# Patient Record
Sex: MALE | Race: BLACK OR AFRICAN AMERICAN | NOT HISPANIC OR LATINO | URBAN - METROPOLITAN AREA
[De-identification: names, ages, dates, MRNs, and addresses within clinical notes are randomized per-mention and may not be internally consistent; named-entity substitution may affect disease eponyms.]

---

## 2017-05-31 ENCOUNTER — EMERGENCY (EMERGENCY)
Facility: HOSPITAL | Age: 22
LOS: 0 days | Discharge: LEFT BEFORE DISCHARGE | End: 2017-05-31

## 2017-06-28 DIAGNOSIS — F10.239 ALCOHOL DEPENDENCE WITH WITHDRAWAL, UNSPECIFIED: ICD-10-CM

## 2017-06-28 DIAGNOSIS — Z87.891 PERSONAL HISTORY OF NICOTINE DEPENDENCE: ICD-10-CM

## 2017-06-28 DIAGNOSIS — Z79.899 OTHER LONG TERM (CURRENT) DRUG THERAPY: ICD-10-CM

## 2017-11-16 ENCOUNTER — EMERGENCY (EMERGENCY)
Facility: HOSPITAL | Age: 22
LOS: 0 days | Discharge: LEFT BEFORE DISCHARGE | End: 2017-11-16

## 2017-11-16 DIAGNOSIS — F17.200 NICOTINE DEPENDENCE, UNSPECIFIED, UNCOMPLICATED: ICD-10-CM

## 2017-11-16 DIAGNOSIS — F20.0 PARANOID SCHIZOPHRENIA: ICD-10-CM

## 2017-11-16 DIAGNOSIS — F10.10 ALCOHOL ABUSE, UNCOMPLICATED: ICD-10-CM

## 2017-11-16 DIAGNOSIS — Z79.899 OTHER LONG TERM (CURRENT) DRUG THERAPY: ICD-10-CM

## 2017-11-16 DIAGNOSIS — F10.20 ALCOHOL DEPENDENCE, UNCOMPLICATED: ICD-10-CM

## 2017-11-16 DIAGNOSIS — Z87.891 PERSONAL HISTORY OF NICOTINE DEPENDENCE: ICD-10-CM

## 2017-11-16 DIAGNOSIS — F20.9 SCHIZOPHRENIA, UNSPECIFIED: ICD-10-CM

## 2017-11-16 DIAGNOSIS — F16.20 HALLUCINOGEN DEPENDENCE, UNCOMPLICATED: ICD-10-CM

## 2017-11-19 ENCOUNTER — INPATIENT (INPATIENT)
Facility: HOSPITAL | Age: 22
LOS: 1 days | Discharge: LEFT BEFORE DISCHARGE | End: 2017-11-21
Attending: INTERNAL MEDICINE

## 2017-11-19 DIAGNOSIS — F20.9 SCHIZOPHRENIA, UNSPECIFIED: ICD-10-CM

## 2017-11-19 DIAGNOSIS — F16.20 HALLUCINOGEN DEPENDENCE, UNCOMPLICATED: ICD-10-CM

## 2017-11-19 DIAGNOSIS — F20.0 PARANOID SCHIZOPHRENIA: ICD-10-CM

## 2017-11-19 DIAGNOSIS — F10.20 ALCOHOL DEPENDENCE, UNCOMPLICATED: ICD-10-CM

## 2017-11-19 DIAGNOSIS — F17.200 NICOTINE DEPENDENCE, UNSPECIFIED, UNCOMPLICATED: ICD-10-CM

## 2017-11-29 DIAGNOSIS — F10.20 ALCOHOL DEPENDENCE, UNCOMPLICATED: ICD-10-CM

## 2017-11-29 DIAGNOSIS — F16.20 HALLUCINOGEN DEPENDENCE, UNCOMPLICATED: ICD-10-CM

## 2017-11-29 DIAGNOSIS — F17.210 NICOTINE DEPENDENCE, CIGARETTES, UNCOMPLICATED: ICD-10-CM
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
## 2017-11-29 DIAGNOSIS — F20.0 PARANOID SCHIZOPHRENIA: ICD-10-CM

## 2017-11-29 DIAGNOSIS — F12.20 CANNABIS DEPENDENCE, UNCOMPLICATED: ICD-10-CM

## 2019-05-29 ENCOUNTER — OUTPATIENT (OUTPATIENT)
Dept: OUTPATIENT SERVICES | Facility: HOSPITAL | Age: 24
LOS: 1 days | Discharge: HOME | End: 2019-05-29

## 2019-05-29 DIAGNOSIS — F12.20 CANNABIS DEPENDENCE, UNCOMPLICATED: ICD-10-CM

## 2019-05-29 DIAGNOSIS — F20.9 SCHIZOPHRENIA, UNSPECIFIED: ICD-10-CM

## 2019-06-06 ENCOUNTER — OUTPATIENT (OUTPATIENT)
Dept: OUTPATIENT SERVICES | Facility: HOSPITAL | Age: 24
LOS: 1 days | Discharge: HOME | End: 2019-06-06
Payer: COMMERCIAL

## 2019-06-06 DIAGNOSIS — F20.9 SCHIZOPHRENIA, UNSPECIFIED: ICD-10-CM

## 2019-06-06 PROCEDURE — 99214 OFFICE O/P EST MOD 30 MIN: CPT | Mod: GC

## 2019-06-26 ENCOUNTER — OUTPATIENT (OUTPATIENT)
Dept: OUTPATIENT SERVICES | Facility: HOSPITAL | Age: 24
LOS: 1 days | Discharge: HOME | End: 2019-06-26

## 2019-06-26 DIAGNOSIS — F20.9 SCHIZOPHRENIA, UNSPECIFIED: ICD-10-CM

## 2019-07-23 ENCOUNTER — OUTPATIENT (OUTPATIENT)
Dept: OUTPATIENT SERVICES | Facility: HOSPITAL | Age: 24
LOS: 1 days | Discharge: HOME | End: 2019-07-23
Payer: COMMERCIAL

## 2019-07-23 DIAGNOSIS — F20.9 SCHIZOPHRENIA, UNSPECIFIED: ICD-10-CM

## 2019-07-23 PROCEDURE — 99214 OFFICE O/P EST MOD 30 MIN: CPT | Mod: GC

## 2019-08-20 ENCOUNTER — OUTPATIENT (OUTPATIENT)
Dept: OUTPATIENT SERVICES | Facility: HOSPITAL | Age: 24
LOS: 1 days | Discharge: HOME | End: 2019-08-20
Payer: COMMERCIAL

## 2019-08-20 DIAGNOSIS — F20.9 SCHIZOPHRENIA, UNSPECIFIED: ICD-10-CM

## 2019-08-20 PROCEDURE — 99213 OFFICE O/P EST LOW 20 MIN: CPT | Mod: GC

## 2019-09-17 ENCOUNTER — OUTPATIENT (OUTPATIENT)
Dept: OUTPATIENT SERVICES | Facility: HOSPITAL | Age: 24
LOS: 1 days | Discharge: HOME | End: 2019-09-17

## 2019-09-17 DIAGNOSIS — F20.9 SCHIZOPHRENIA, UNSPECIFIED: ICD-10-CM

## 2019-10-15 ENCOUNTER — INPATIENT (INPATIENT)
Facility: HOSPITAL | Age: 24
LOS: 12 days | Discharge: HOME | End: 2019-10-28
Attending: PSYCHIATRY & NEUROLOGY | Admitting: PSYCHIATRY & NEUROLOGY
Payer: COMMERCIAL

## 2019-10-15 ENCOUNTER — OUTPATIENT (OUTPATIENT)
Dept: OUTPATIENT SERVICES | Facility: HOSPITAL | Age: 24
LOS: 1 days | Discharge: HOME | End: 2019-10-15

## 2019-10-15 VITALS
HEART RATE: 95 BPM | RESPIRATION RATE: 18 BRPM | OXYGEN SATURATION: 100 % | SYSTOLIC BLOOD PRESSURE: 139 MMHG | TEMPERATURE: 97 F | DIASTOLIC BLOOD PRESSURE: 82 MMHG

## 2019-10-15 DIAGNOSIS — F20.1 DISORGANIZED SCHIZOPHRENIA: ICD-10-CM

## 2019-10-15 DIAGNOSIS — F20.9 SCHIZOPHRENIA, UNSPECIFIED: ICD-10-CM

## 2019-10-15 LAB
ALBUMIN SERPL ELPH-MCNC: 4.5 G/DL — SIGNIFICANT CHANGE UP (ref 3.5–5.2)
ALP SERPL-CCNC: 74 U/L — SIGNIFICANT CHANGE UP (ref 30–115)
ALT FLD-CCNC: 14 U/L — SIGNIFICANT CHANGE UP (ref 0–41)
ANION GAP SERPL CALC-SCNC: 12 MMOL/L — SIGNIFICANT CHANGE UP (ref 7–14)
APAP SERPL-MCNC: <5 UG/ML — LOW (ref 10–30)
AST SERPL-CCNC: 19 U/L — SIGNIFICANT CHANGE UP (ref 0–41)
BASOPHILS # BLD AUTO: 0.05 K/UL — SIGNIFICANT CHANGE UP (ref 0–0.2)
BASOPHILS NFR BLD AUTO: 0.9 % — SIGNIFICANT CHANGE UP (ref 0–1)
BILIRUB SERPL-MCNC: 0.3 MG/DL — SIGNIFICANT CHANGE UP (ref 0.2–1.2)
BUN SERPL-MCNC: 16 MG/DL — SIGNIFICANT CHANGE UP (ref 10–20)
CALCIUM SERPL-MCNC: 9.9 MG/DL — SIGNIFICANT CHANGE UP (ref 8.5–10.1)
CHLORIDE SERPL-SCNC: 103 MMOL/L — SIGNIFICANT CHANGE UP (ref 98–110)
CO2 SERPL-SCNC: 25 MMOL/L — SIGNIFICANT CHANGE UP (ref 17–32)
CREAT SERPL-MCNC: 0.7 MG/DL — SIGNIFICANT CHANGE UP (ref 0.7–1.5)
EOSINOPHIL # BLD AUTO: 0.33 K/UL — SIGNIFICANT CHANGE UP (ref 0–0.7)
EOSINOPHIL NFR BLD AUTO: 5.7 % — SIGNIFICANT CHANGE UP (ref 0–8)
GLUCOSE SERPL-MCNC: 97 MG/DL — SIGNIFICANT CHANGE UP (ref 70–99)
HCT VFR BLD CALC: 44.7 % — SIGNIFICANT CHANGE UP (ref 42–52)
HGB BLD-MCNC: 14.5 G/DL — SIGNIFICANT CHANGE UP (ref 14–18)
IMM GRANULOCYTES NFR BLD AUTO: 0.5 % — HIGH (ref 0.1–0.3)
LYMPHOCYTES # BLD AUTO: 1.87 K/UL — SIGNIFICANT CHANGE UP (ref 1.2–3.4)
LYMPHOCYTES # BLD AUTO: 32.3 % — SIGNIFICANT CHANGE UP (ref 20.5–51.1)
MCHC RBC-ENTMCNC: 31 PG — SIGNIFICANT CHANGE UP (ref 27–31)
MCHC RBC-ENTMCNC: 32.4 G/DL — SIGNIFICANT CHANGE UP (ref 32–37)
MCV RBC AUTO: 95.7 FL — HIGH (ref 80–94)
MONOCYTES # BLD AUTO: 0.8 K/UL — HIGH (ref 0.1–0.6)
MONOCYTES NFR BLD AUTO: 13.8 % — HIGH (ref 1.7–9.3)
NEUTROPHILS # BLD AUTO: 2.71 K/UL — SIGNIFICANT CHANGE UP (ref 1.4–6.5)
NEUTROPHILS NFR BLD AUTO: 46.8 % — SIGNIFICANT CHANGE UP (ref 42.2–75.2)
NRBC # BLD: 0 /100 WBCS — SIGNIFICANT CHANGE UP (ref 0–0)
PLATELET # BLD AUTO: 253 K/UL — SIGNIFICANT CHANGE UP (ref 130–400)
POTASSIUM SERPL-MCNC: 4.3 MMOL/L — SIGNIFICANT CHANGE UP (ref 3.5–5)
POTASSIUM SERPL-SCNC: 4.3 MMOL/L — SIGNIFICANT CHANGE UP (ref 3.5–5)
PROT SERPL-MCNC: 7.2 G/DL — SIGNIFICANT CHANGE UP (ref 6–8)
RBC # BLD: 4.67 M/UL — LOW (ref 4.7–6.1)
RBC # FLD: 13 % — SIGNIFICANT CHANGE UP (ref 11.5–14.5)
SALICYLATES SERPL-MCNC: <0.3 MG/DL — LOW (ref 4–30)
SODIUM SERPL-SCNC: 140 MMOL/L — SIGNIFICANT CHANGE UP (ref 135–146)
WBC # BLD: 5.79 K/UL — SIGNIFICANT CHANGE UP (ref 4.8–10.8)
WBC # FLD AUTO: 5.79 K/UL — SIGNIFICANT CHANGE UP (ref 4.8–10.8)

## 2019-10-15 PROCEDURE — 93010 ELECTROCARDIOGRAM REPORT: CPT

## 2019-10-15 PROCEDURE — 99285 EMERGENCY DEPT VISIT HI MDM: CPT

## 2019-10-15 PROCEDURE — 90792 PSYCH DIAG EVAL W/MED SRVCS: CPT | Mod: GC

## 2019-10-15 RX ORDER — OLANZAPINE 15 MG/1
20 TABLET, FILM COATED ORAL AT BEDTIME
Refills: 0 | Status: DISCONTINUED | OUTPATIENT
Start: 2019-10-15 | End: 2019-10-16

## 2019-10-15 RX ORDER — OLANZAPINE 15 MG/1
20 TABLET, FILM COATED ORAL AT BEDTIME
Refills: 0 | Status: DISCONTINUED | OUTPATIENT
Start: 2019-10-16 | End: 2019-10-17

## 2019-10-15 RX ORDER — HALOPERIDOL DECANOATE 100 MG/ML
5 INJECTION INTRAMUSCULAR AT BEDTIME
Refills: 0 | Status: DISCONTINUED | OUTPATIENT
Start: 2019-10-15 | End: 2019-10-28

## 2019-10-15 RX ORDER — HALOPERIDOL DECANOATE 100 MG/ML
5 INJECTION INTRAMUSCULAR EVERY 6 HOURS
Refills: 0 | Status: DISCONTINUED | OUTPATIENT
Start: 2019-10-15 | End: 2019-10-16

## 2019-10-15 RX ORDER — BENZTROPINE MESYLATE 1 MG
2 TABLET ORAL EVERY 12 HOURS
Refills: 0 | Status: DISCONTINUED | OUTPATIENT
Start: 2019-10-15 | End: 2019-10-28

## 2019-10-15 RX ORDER — HALOPERIDOL DECANOATE 100 MG/ML
5 INJECTION INTRAMUSCULAR EVERY 6 HOURS
Refills: 0 | Status: DISCONTINUED | OUTPATIENT
Start: 2019-10-16 | End: 2019-10-28

## 2019-10-15 RX ORDER — HALOPERIDOL DECANOATE 100 MG/ML
10 INJECTION INTRAMUSCULAR DAILY
Refills: 0 | Status: DISCONTINUED | OUTPATIENT
Start: 2019-10-16 | End: 2019-10-16

## 2019-10-15 RX ORDER — HALOPERIDOL DECANOATE 100 MG/ML
10 INJECTION INTRAMUSCULAR DAILY
Refills: 0 | Status: DISCONTINUED | OUTPATIENT
Start: 2019-10-16 | End: 2019-10-28

## 2019-10-15 RX ORDER — DIVALPROEX SODIUM 500 MG/1
1000 TABLET, DELAYED RELEASE ORAL EVERY 12 HOURS
Refills: 0 | Status: DISCONTINUED | OUTPATIENT
Start: 2019-10-15 | End: 2019-10-23

## 2019-10-15 RX ADMIN — DIVALPROEX SODIUM 1000 MILLIGRAM(S): 500 TABLET, DELAYED RELEASE ORAL at 21:33

## 2019-10-15 RX ADMIN — OLANZAPINE 20 MILLIGRAM(S): 15 TABLET, FILM COATED ORAL at 21:33

## 2019-10-15 RX ADMIN — HALOPERIDOL DECANOATE 5 MILLIGRAM(S): 100 INJECTION INTRAMUSCULAR at 21:33

## 2019-10-15 RX ADMIN — Medication 2 MILLIGRAM(S): at 21:33

## 2019-10-15 NOTE — ED PROVIDER NOTE - ATTENDING CONTRIBUTION TO CARE
I personally evaluated the patient. I reviewed the Resident’s or Physician Assistant’s note (as assigned above), and agree with the findings and plan except as documented in my note. I personally evaluated the patient. I reviewed the Resident’s or Physician Assistant’s note (as assigned above), and agree with the findings and plan except as documented in my note.  23 Y/O M SCHIZOPHRENIA (VARIABLY COMPLIANT WITH HIS PSYCH MEDICATIONS), MULTIPLE PRIOR IPP ADMISSIONS (LAST ADMISSION 5/2019), SENT TO ED BY OUTPT PSYCHIATRIST WITH HALLUCINATIONS AND DISORGANIZED THINKING. NO SUICIDAL OR HOMICIDAL IDEATION. PT RECEIVED HIS HALDOL DEPOT LAST MONTH AS SCHEDULED. PT REPORTS MARIJUANA AND PCP USE. NO ALCOHOL USE. + SMOKER. VITALS NOTED. ALERT OX3 NAD + NORMAL AFFECT. CALM AND COOPERATIVE. GOOD EYE CONTACT. NCAT PERRL. EOMI. OP NORMAL. MMM. NECK SUPPLE. LUNGS CLEAR B/L. RRR. S1S2. ABD- SOFT NONTENDER. NO LEG EDEMA. CN 2-12 INTACT. NEURO EXAM NONFOCAL.
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
## 2019-10-15 NOTE — ED PROVIDER NOTE - CLINICAL SUMMARY MEDICAL DECISION MAKING FREE TEXT BOX
Patient assessed by psych team and found to have acute decompensation of chronic schizophrenia sx. He remained in the ED without incident, medically cleared and transferred to [facility] for IPP.

## 2019-10-15 NOTE — ED ADULT NURSE NOTE - ASSOCIATED SYMPTOMS
mental health evaluation, pt denies hearing any voices at this time but has in the past, denies any SI or HI

## 2019-10-15 NOTE — ED BEHAVIORAL HEALTH ASSESSMENT NOTE - CASE SUMMARY
Mr Quintero is a 24 year old man with a history of Schizophrenia who was referred to the ED by his outpatient psychiatrist for the evaluation of worsening psychosis . Patient appears to be increasingly disorganised in behavior , unable to take care of himself, internally pre-occupied , responding to internal stimuli. his medication compliance is questionable at this time, although it appears that he is compliant with his haldol decanoate. it is also possible that his continued use of PCP is contributing to his acute decompensation.  At this time, patient is considered a danger to himself and others and needs involuntary inpatient psychiatric hospitalization. patient will be re-started on his medication as prescribed by his outpatient psychiatrist Dr Prajapati with the plan for further adjustments as needed.
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
## 2019-10-15 NOTE — ED PROVIDER NOTE - PHYSICAL EXAMINATION
CONSTITUTIONAL: Non-toxic appearing.   SKIN: warm, dry. Diffuse dry skin on upper and lower extremities.   HEAD: Normocephalic; atraumatic.  EYES: PERRL, EOMI, normal sclera and conjunctiva. +4 beats of vertical nystagmus on upward gaze.   ENT: No nasal discharge; airway clear.  NECK: Supple; non tender.  CARD: S1, S2 normal; no murmurs, gallops, or rubs. Regular rate and rhythm.   RESP: No wheezes, rales or rhonchi.  ABD: soft ntnd  EXT: Normal ROM.  No clubbing, cyanosis or edema.   LYMPH: No acute cervical adenopathy.  NEURO: Alert, oriented, grossly unremarkable  PSYCH: Cooperative. CONSTITUTIONAL: Non-toxic appearing.   SKIN: warm, dry. Diffuse dry skin on upper and lower extremities.   HEAD: Normocephalic; atraumatic.  EYES: PERRL, EOMI, normal sclera and conjunctiva. +4 beats of vertical nystagmus on upward gaze.   ENT: No nasal discharge; airway clear.  NECK: Supple; non tender.  CARD: S1, S2 normal; no murmurs, gallops, or rubs. Regular rate and rhythm.   RESP: No wheezes, rales or rhonchi.  ABD: soft ntnd  EXT: Normal ROM.  No clubbing, cyanosis or edema.   LYMPH: No acute cervical adenopathy.  NEURO: Alert, oriented, grossly unremarkable  PSYCH: Cooperative. No suicidal or homicidal ideation. Speech coherent.

## 2019-10-15 NOTE — ED ADULT NURSE REASSESSMENT NOTE - NS ED NURSE REASSESS COMMENT FT1
Patient resting comfortably in stretcher awaiting bed at [facility] inpatient psych unit. Patient has denied suicidal/ homicidal ideations, denies auditory/ visual hallucinations. Patient voices no complaints at this time. 1:1 sit in place for patient safety. Will continue to monitor.

## 2019-10-15 NOTE — ED BEHAVIORAL HEALTH ASSESSMENT NOTE - RISK ASSESSMENT
Risk Factors: Acute psychosis, PCP abuse, Cannabis Abuse, concern from grandparents, acute safety concerns from outpatient psychiatrist, noncompliance with medications.   Protective factors: Strong social support from grandparents, compliant with haldol dec, access to outpatient mental health services.     Based on the above, the patient has an acute risk which requires IPP admission at this time. Low Acute Suicide Risk

## 2019-10-15 NOTE — ED PROVIDER NOTE - CARE PLAN
Principal Discharge DX:	Psychiatric symptoms Principal Discharge DX:	Schizophrenia, unspecified type  Secondary Diagnosis:	Hallucinations
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
## 2019-10-15 NOTE — ED PROVIDER NOTE - PMH
Ambulatory from department without difficulty. No distress noted. Pt instructed to follow up with family doctor or doctor referred by ED physician. Pt also given number to the Pt advocate. Pt reports no further needs or questions prior to discharge.      Nidia Cunningham RN  08/28/18 [id number]
Schizophrenia

## 2019-10-15 NOTE — ED PROVIDER NOTE - PROGRESS NOTE DETAILS
24 year old male w/ a pmh of schizophrenia on haldol decanoate 150mg (last dose 9/17/19 was due today), haldol, olanzapine benztropine was brought in from the psych for "internally preoccupied responding to internal stimuli". patient has a history of PCP use, UDES was positive on 9/17/19  for PCP . As per transfer note patient has a history of being impuslive & responding to command hallucinations and they are unsure of his adherence with po medications since patient lives with granfather who administers them to him and reports "sometimes he isn't home". PSYCHIATRY CONSULTED AND RESIDENT AT BEDSIDE. EKG UNCHANGED FROM PRIOR OF 11/19/17, NO STEMI. PT ASYMPTOMATIC. PT SIGNED OUT TO DR. KUMAR, IPP HOLD, AWAITING TRANSFER, REASSESS. Patient sleeping comfortably. No issues. Discussed with Dr. Beasley, will observe and reassess ASHLEY - patient endorsed to me as IPP admission pending placement, no events overnight, will endorse to AM team

## 2019-10-15 NOTE — ED PROVIDER NOTE - NS ED ROS FT
Constitutional: no fevers, no chills.   Eyes:  No visual changes, eye pain or discharge. Denies visual hallucinations.   ENMT:  No hearing changes, pain, discharge or infections. No neck pain or stiffness. Denies auditory hallucinations.   Cardiac:  No chest pain, shortness of breath.   Respiratory:  No cough or respiratory distress. No hemoptysis. No history of asthma or RAD.  GI:  No nausea, vomiting, diarrhea or abdominal pain.  :  No dysuria, frequency or burning.  MS:  No myalgia, muscle weakness, joint pain or back pain.  Neuro:  No headache or weakness.  No LOC.  Skin:  No skin rash.

## 2019-10-15 NOTE — ED BEHAVIORAL HEALTH ASSESSMENT NOTE - CURRENT MEDICATION
haldol dec 150mg IM Q4 weeks (last administered today 9/17/19), depakote 1000mg BID, haldol 10mg QAM, haldol 5mg QHS, olanzapine 20mg QHS, benztropine 2mg BID

## 2019-10-15 NOTE — ED BEHAVIORAL HEALTH ASSESSMENT NOTE - PSYCHIATRIC ISSUES AND PLAN (INCLUDE STANDING AND PRN MEDICATION)
depakote 1000mg BID, haldol 10mg QAM, haldol 5mg QHS, olanzapine 20mg QHS, benztropine 2mg BID. Gets Haldol Dec [id number], next due 10/17/19.

## 2019-10-15 NOTE — ED ADULT NURSE NOTE - NSIMPLEMENTINTERV_GEN_ALL_ED
Implemented All Universal Safety Interventions:  Sylvia to call system. Call bell, personal items and telephone within reach. Instruct patient to call for assistance. Room bathroom lighting operational. Non-slip footwear when patient is off stretcher. Physically safe environment: no spills, clutter or unnecessary equipment. Stretcher in lowest position, wheels locked, appropriate side rails in place.

## 2019-10-16 DIAGNOSIS — F20.9 SCHIZOPHRENIA, UNSPECIFIED: ICD-10-CM

## 2019-10-16 PROCEDURE — 99232 SBSQ HOSP IP/OBS MODERATE 35: CPT

## 2019-10-16 RX ORDER — NICOTINE POLACRILEX 2 MG
2 GUM BUCCAL EVERY 4 HOURS
Refills: 0 | Status: DISCONTINUED | OUTPATIENT
Start: 2019-10-16 | End: 2019-10-28

## 2019-10-16 RX ORDER — DIVALPROEX SODIUM 500 MG/1
1000 TABLET, DELAYED RELEASE ORAL
Refills: 0 | Status: DISCONTINUED | OUTPATIENT
Start: 2019-10-16 | End: 2019-10-16

## 2019-10-16 RX ORDER — HALOPERIDOL DECANOATE 100 MG/ML
5 INJECTION INTRAMUSCULAR AT BEDTIME
Refills: 0 | Status: DISCONTINUED | OUTPATIENT
Start: 2019-10-16 | End: 2019-10-16

## 2019-10-16 RX ORDER — BENZTROPINE MESYLATE 1 MG
2 TABLET ORAL
Refills: 0 | Status: DISCONTINUED | OUTPATIENT
Start: 2019-10-16 | End: 2019-10-16

## 2019-10-16 RX ADMIN — Medication 2 MILLIGRAM(S): at 09:22

## 2019-10-16 RX ADMIN — HALOPERIDOL DECANOATE 10 MILLIGRAM(S): 100 INJECTION INTRAMUSCULAR at 11:00

## 2019-10-16 RX ADMIN — DIVALPROEX SODIUM 1000 MILLIGRAM(S): 500 TABLET, DELAYED RELEASE ORAL at 09:22

## 2019-10-16 NOTE — H&P ADULT - ASSESSMENT
Mr Quintero is a 24 year old man with a history of Schizophrenia who was referred to the ED by his outpatient psychiatrist for the evaluation of worsening psychosis. Patient appears to be increasingly disorganized in behavior, unable to take care of himself, internally pre-occupied, responding to internal stimuli. His medication compliance is questionable at this time, although it appears that he is compliant with his Haldol deaconate. It is also possible that his continued use of PCP is contributing to his acute decompensation.  At this time, patient is considered a danger to himself and others and needs involuntary inpatient psychiatric hospitalization. Patient will be re-started on his medication .

## 2019-10-16 NOTE — ED BEHAVIORAL HEALTH NOTE - BEHAVIORAL HEALTH NOTE
Interval History  patient seen and interviewed today.   He denies any fresh complaints , reports good sleep and appetite , requests to go home. he continues to appear internally preoccupied , responding to internal stimuli . he however denies acute symptoms of psychosis , kaia or depression. he denies suicidal ideations, intent or plan.       Mental Status exam  Patient is alert and awake , well oriented to time, place and person, calm and cooperative however appears internally preoccupied , responding to internal stimuli , Speech is soft but normal in rate , quality and quantity, mood is " okay", Affect is constricted ,Thought process is concrete , thought content : denies suicidal ideations, denies paranoid delusions , focused on going home, Perceptions : denies auditory hallucinations , insight and judgement - poor       Vitals   Vital Signs Last 24 Hrs  T(C): 36.4 (16 Oct 2019 07:22), Max: 36.4 (16 Oct 2019 06:19)  T(F): 97.6 (16 Oct 2019 07:22), Max: 97.6 (16 Oct 2019 07:22)  HR: 68 (16 Oct 2019 07:22) (66 - 95)  BP: 102/63 (16 Oct 2019 07:22) (102/63 - 139/82)  BP(mean): --  RR: 18 (16 Oct 2019 07:22) (18 - 18)  SpO2: 99% (16 Oct 2019 07:22) (99% - 100%)    Labs:                        14.5   5.79  )-----------( 253      ( 15 Oct 2019 14:49 )             44.7     10-15    140  |  103  |  16  ----------------------------<  97  4.3   |  25  |  0.7    Ca    9.9      15 Oct 2019 14:49    TPro  7.2  /  Alb  4.5  /  TBili  0.3  /  DBili  x   /  AST  19  /  ALT  14  /  AlkPhos  74  10-15        Diagnosis:  Paranoid Schizophrenia       Impression :  Mr Quintero is a 24 year old man with a history of Schizophrenia who was referred to the ED by his outpatient psychiatrist for the evaluation of worsening psychosis. Patient appears to be increasingly disorganized in behavior, unable to take care of himself, internally pre-occupied, responding to internal stimuli. His medication compliance is questionable at this time, although it appears that he is compliant with his Haldol deaconate. It is also possible that his continued use of PCP is contributing to his acute decompensation.  At this time, patient is considered a danger to himself and others and needs involuntary inpatient psychiatric hospitalization. Patient will be re-started on his medication .      Plan:   1. Continue psychotropic medications as prescribed : haldol dec 150mg IM Q4 weeks (due 10/17/19), depakote 1000mg BID, haldol 10mg QAM, haldol 5mg QHS, olanzapine 20mg QHS, benztropine 2mg BID  2. Recommend Depakote level   3. Transfer to the inpatient psychiatric floor upon bed availability

## 2019-10-16 NOTE — ED ADULT NURSE REASSESSMENT NOTE - NS ED NURSE REASSESS COMMENT FT1
Pt is currently resting, no acute distress, cooperative, 1:1 initiated. VSS. Will continue to monitor. Pt at risk for elopement. Safety and comfort measures maintained.

## 2019-10-17 DIAGNOSIS — F25.9 SCHIZOAFFECTIVE DISORDER, UNSPECIFIED: ICD-10-CM

## 2019-10-17 PROCEDURE — 99231 SBSQ HOSP IP/OBS SF/LOW 25: CPT

## 2019-10-17 RX ADMIN — HALOPERIDOL DECANOATE 10 MILLIGRAM(S): 100 INJECTION INTRAMUSCULAR at 08:34

## 2019-10-17 RX ADMIN — Medication 2 MILLIGRAM(S): at 20:34

## 2019-10-17 RX ADMIN — Medication 2 MILLIGRAM(S): at 10:18

## 2019-10-17 RX ADMIN — DIVALPROEX SODIUM 1000 MILLIGRAM(S): 500 TABLET, DELAYED RELEASE ORAL at 10:18

## 2019-10-17 RX ADMIN — DIVALPROEX SODIUM 1000 MILLIGRAM(S): 500 TABLET, DELAYED RELEASE ORAL at 20:33

## 2019-10-17 RX ADMIN — HALOPERIDOL DECANOATE 5 MILLIGRAM(S): 100 INJECTION INTRAMUSCULAR at 20:34
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
## 2019-10-17 NOTE — PROGRESS NOTE BEHAVIORAL HEALTH - NSBHCHARTREVIEWINVESTIGATE_PSY_A_CORE FT
Ventricular Rate 60 BPM    Atrial Rate 60 BPM    P-R Interval 144 ms    QRS Duration 96 ms    Q-T Interval 366 ms    QTC Calculation(Bezet) 366 ms    P Axis 62 degrees    R Axis 53 degrees    T Axis -10 degrees    Diagnosis Line Normal sinus rhythm  Minimal voltage criteria for LVH, may be normal variant    Confirmed by ROBERTO SANDOVAL, DANIEL ([id number]) on 10/17/2019 11:12:16 AM

## 2019-10-17 NOTE — CONSULT NOTE ADULT - SUBJECTIVE AND OBJECTIVE BOX
MARQUISE JEREMIAS  24y  Male      Patient is a 24y old  Male who presents with a chief complaint of schizophrenia / worsening psychosis (16 Oct 2019 21:45)    HPI:  Mr Quintero is a 24 year old man with a history of Schizophrenia who was referred to the ED by his outpatient psychiatrist for the evaluation of worsening psychosis. Patient appears to be increasingly disorganized in behavior, unable to take care of himself, internally pre-occupied, responding to internal stimuli. His medication compliance is questionable at this time, although it appears that he is compliant with his Haldol deaconate. It is also possible that his continued use of PCP is contributing to his acute decompensation.  At this time, patient is considered a danger to himself and others and needs involuntary inpatient psychiatric hospitalization. Patient will be re-started on his medication . (16 Oct 2019 21:45)    INTERVAL HPI/OVERNIGHT EVENTS:  HEALTH ISSUES - PROBLEM Dx:  Schizophrenia, unspecified type: Schizophrenia, unspecified type  Disorganized schizophrenia    lying in bed  in nad    PAST MEDICAL & SURGICAL HISTORY:  Schizophrenia    FAMILY HISTORY:    benztropine 2 milliGRAM(s) Oral every 12 hours  diVALproex DR 1000 milliGRAM(s) Oral every 12 hours  haloperidol     Tablet 5 milliGRAM(s) Oral at bedtime  haloperidol     Tablet 10 milliGRAM(s) Oral daily  haloperidol     Tablet 5 milliGRAM(s) Oral every 6 hours PRN  LORazepam     Tablet 2 milliGRAM(s) Oral every 6 hours PRN  nicotine  Polacrilex Gum 2 milliGRAM(s) Oral every 4 hours PRN  OLANZapine 20 milliGRAM(s) Oral at bedtime      REVIEW OF SYSTEMS:  CONSTITUTIONAL: No fever, weight loss, or fatigue  EYES: No eye pain, visual disturbances, or discharge  ENMT:  No difficulty hearing, tinnitus, vertigo; No sinus or throat pain  NECK: No pain or stiffness  BREASTS: No pain, masses, or nipple discharge  RESPIRATORY: No cough, wheezing, chills or hemoptysis; No shortness of breath  CARDIOVASCULAR: No chest pain, palpitations, dizziness, or leg swelling  GASTROINTESTINAL: No abdominal or epigastric pain. No nausea, vomiting, or hematemesis; No diarrhea or constipation. No melena or hematochezia.  GENITOURINARY: No dysuria, frequency, hematuria, or incontinence  NEUROLOGICAL: No headaches, memory loss, loss of strength, numbness, or tremors  SKIN: No itching, burning, rashes, or lesions   LYMPH NODES: No enlarged glands  ENDOCRINE: No heat or cold intolerance; No hair loss  MUSCULOSKELETAL: No joint pain or swelling; No muscle, back, or extremity pain  PSYCHIATRIC: as per hpi and previous psych history  HEME/LYMPH: No easy bruising, or bleeding gums  ALLERY AND IMMUNOLOGIC: No hives or eczema    T(C): 35.6 (10-17-19 @ 06:23), Max: 36.4 (10-16-19 @ 15:12)  HR: 60 (10-17-19 @ 06:23) (60 - 70)  BP: 117/50 (10-17-19 @ 06:23) (105/70 - 118/67)  RR: 20 (10-17-19 @ 06:23) (16 - 20)  SpO2: 99% (10-16-19 @ 15:12) (99% - 99%)  Wt(kg): --Vital Signs Last 24 Hrs  T(C): 35.6 (17 Oct 2019 06:23), Max: 36.4 (16 Oct 2019 15:12)  T(F): 96.1 (17 Oct 2019 06:23), Max: 97.5 (16 Oct 2019 15:12)  HR: 60 (17 Oct 2019 06:23) (60 - 70)  BP: 117/50 (17 Oct 2019 06:23) (105/70 - 118/67)  BP(mean): --  RR: 20 (17 Oct 2019 06:23) (16 - 20)  SpO2: 99% (16 Oct 2019 15:12) (99% - 99%)    PHYSICAL EXAM:  GENERAL: NAD,well-developed  HEAD:  Atraumatic, Normocephalic  EYES: EOMI, PERRLA, conjunctiva and sclera clear  ENMT: No tonsillar erythema, exudates, or enlargement; Moist mucous membranes, Good dentition, No lesions  NECK: Supple, No JVD, Normal thyroid  CHEST/LUNG: Clear bs bilaterally; No rales, rhonchi, wheezing  HEART: Regular rate and rhythm; No murmurs, rubs, or gallops  ABDOMEN: Soft, Nontender, Nondistended; Bowel sounds present  EXTREMITIES:  2+ Peripheral Pulses, No clubbing, cyanosis, or edema  LYMPH: No lymphadenopathy noted  SKIN: No rashes or lesions  Neuro: alert  no focal deficits    Consultant(s) Notes Reviewed:  [x ] YES  [ ] NO  Care Discussed with Consultants/Other Providers [ x] YES  [ ] NO    LABS:                        14.5   5.79  )-----------( 253      ( 15 Oct 2019 14:49 )             44.7     10-15    140  |  103  |  16  ----------------------------<  97  4.3   |  25  |  0.7    Ca    9.9      15 Oct 2019 14:49    TPro  7.2  /  Alb  4.5  /  TBili  0.3  /  DBili  x   /  AST  19  /  ALT  14  /  AlkPhos  74  10-15        CAPILLARY BLOOD GLUCOSE                RADIOLOGY & ADDITIONAL TESTS:    Imaging Personally Reviewed:  [ ] YES  [ ] NO
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
## 2019-10-17 NOTE — PROGRESS NOTE BEHAVIORAL HEALTH - NSBHCHARTREVIEWLAB_PSY_A_CORE FT
Salicylate Level, Serum (10.15.19 @ 14:49)    Salicylate Level, Serum: <0.3 mg/dL  Acetaminophen Level, Serum (10.15.19 @ 14:49)    Acetaminophen Level, Serum: <5.0 ug/mL  Complete Blood Count + Automated Diff (10.15.19 @ 14:49)    WBC Count: 5.79 K/uL    RBC Count: 4.67 M/uL    Hemoglobin: 14.5 g/dL    Hematocrit: 44.7 %    Mean Cell Volume: 95.7 fL    Mean Cell Hemoglobin: 31.0 pg    Mean Cell Hemoglobin Conc: 32.4 g/dL    Red Cell Distrib Width: 13.0 %    Platelet Count - Automated: 253 K/uL    Auto Neutrophil #: 2.71 K/uL    Auto Lymphocyte #: 1.87 K/uL    Auto Monocyte #: 0.80 K/uL    Auto Eosinophil #: 0.33 K/uL    Auto Basophil #: 0.05 K/uL    Auto Neutrophil %: 46.8: Differential percentages must be correlated with absolute numbers for  clinical significance. %    Auto Lymphocyte %: 32.3 %    Auto Monocyte %: 13.8 %    Auto Eosinophil %: 5.7 %    Auto Basophil %: 0.9 %    Auto Immature Granulocyte %: 0.5 %    Nucleated RBC: 0 /100 WBCs  Comprehensive Metabolic Panel (10.15.19 @ 14:49)    Sodium, Serum: 140 mmol/L    Potassium, Serum: 4.3 mmol/L    Chloride, Serum: 103 mmol/L    Carbon Dioxide, Serum: 25 mmol/L    Anion Gap, Serum: 12 mmol/L    Blood Urea Nitrogen, Serum: 16 mg/dL    Creatinine, Serum: 0.7 mg/dL    Glucose, Serum: 97 mg/dL    Calcium, Total Serum: 9.9 mg/dL    Protein Total, Serum: 7.2 g/dL    Albumin, Serum: 4.5 g/dL    Bilirubin Total, Serum: 0.3 mg/dL    Alkaline Phosphatase, Serum: 74 U/L    Aspartate Aminotransferase (AST/SGOT): 19 U/L    Alanine Aminotransferase (ALT/SGPT): 14 U/L    eGFR if Non : 132: Interpretative comment    eGFR if : 153 mL/min/1.73M2

## 2019-10-17 NOTE — PROGRESS NOTE BEHAVIORAL HEALTH - VIOLENCE RISK FACTORS:
Command hallucinations for violent behavior/Impulsivity/Substance abuse/Lack of insight into violence risk/need for treatment

## 2019-10-17 NOTE — PROGRESS NOTE BEHAVIORAL HEALTH - NSBHADDHXPSYSOCFT_PSY_A_CORE
Lives with grandparents and has not lived with his mother since he completed high school. He has no siblings and his highest level of education was getting a high school degree. Patient says he can talk to his grandparents for issues going on in his life.

## 2019-10-17 NOTE — PROGRESS NOTE BEHAVIORAL HEALTH - NSBHADMITIPBHPROVFT_PSY_A_CORE
Outpatient Clinic at [facility] faxed over prescriptions and note from most recent outpatient visit.
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
## 2019-10-17 NOTE — PROGRESS NOTE BEHAVIORAL HEALTH - NSBHFUPSTRENGTHS_PSY_A_CORE
Attempting to realize his/her potential/Has supportive interpersonal relationships with family, friends or peers/Has access to housing/residential stability

## 2019-10-17 NOTE — PROGRESS NOTE BEHAVIORAL HEALTH - NSBHFUPIPCHARTREVFT_PSY_A_CORE
Mr Quintero is a 24 year old man with a history of Schizophrenia who was referred to the ED by his outpatient psychiatrist for the evaluation of worsening psychosis. Patient appears to be increasingly disorganized in behavior, unable to take care of himself, internally pre-occupied, responding to internal stimuli. His medication compliance is questionable at this time, although it appears that he is compliant with his Haldol deaconate. It is also possible that his continued use of PCP is contributing to his acute decompensation.  At this time, patient is considered a danger to himself and others and needs involuntary inpatient psychiatric hospitalization. Patient will be re-started on his medication . Mr Quintero is a 24 year old man with a history of Schizophrenia who was referred to the ED by his outpatient psychiatrist for the evaluation of worsening psychosis. Patient appears to be increasingly disorganized in behavior, unable to take care of himself, internally pre-occupied, responding to internal stimuli. His medication compliance is questionable at this time, although it appears that he is compliant with his Haldol deaconate. It is also possible that his continued use of PCP is contributing to his acute decompensation.  At this time, patient is considered a danger to himself and others and needs involuntary inpatient psychiatric hospitalization. Patient will be re-started on his medication.

## 2019-10-18 PROCEDURE — 99231 SBSQ HOSP IP/OBS SF/LOW 25: CPT

## 2019-10-18 PROCEDURE — 73630 X-RAY EXAM OF FOOT: CPT | Mod: 26,RT

## 2019-10-18 RX ADMIN — DIVALPROEX SODIUM 1000 MILLIGRAM(S): 500 TABLET, DELAYED RELEASE ORAL at 20:12

## 2019-10-18 RX ADMIN — Medication 2 MILLIGRAM(S): at 08:23

## 2019-10-18 RX ADMIN — HALOPERIDOL DECANOATE 5 MILLIGRAM(S): 100 INJECTION INTRAMUSCULAR at 20:12

## 2019-10-18 RX ADMIN — HALOPERIDOL DECANOATE 10 MILLIGRAM(S): 100 INJECTION INTRAMUSCULAR at 08:23

## 2019-10-18 RX ADMIN — DIVALPROEX SODIUM 1000 MILLIGRAM(S): 500 TABLET, DELAYED RELEASE ORAL at 08:29

## 2019-10-18 RX ADMIN — Medication 2 MILLIGRAM(S): at 20:12
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
## 2019-10-18 NOTE — PROGRESS NOTE BEHAVIORAL HEALTH - NSBHADMITIPBHPROVFT_PSY_A_CORE
[facility] faxed over prescriptions and note from most recent outpatient visit.

## 2019-10-19 PROCEDURE — 99231 SBSQ HOSP IP/OBS SF/LOW 25: CPT

## 2019-10-19 RX ADMIN — DIVALPROEX SODIUM 1000 MILLIGRAM(S): 500 TABLET, DELAYED RELEASE ORAL at 19:58

## 2019-10-19 RX ADMIN — DIVALPROEX SODIUM 1000 MILLIGRAM(S): 500 TABLET, DELAYED RELEASE ORAL at 08:12

## 2019-10-19 RX ADMIN — HALOPERIDOL DECANOATE 5 MILLIGRAM(S): 100 INJECTION INTRAMUSCULAR at 19:58

## 2019-10-19 RX ADMIN — Medication 2 MILLIGRAM(S): at 19:58

## 2019-10-19 RX ADMIN — HALOPERIDOL DECANOATE 10 MILLIGRAM(S): 100 INJECTION INTRAMUSCULAR at 08:10

## 2019-10-19 RX ADMIN — Medication 2 MILLIGRAM(S): at 08:13

## 2019-10-20 PROCEDURE — 99232 SBSQ HOSP IP/OBS MODERATE 35: CPT

## 2019-10-20 RX ORDER — DIPHENHYDRAMINE HCL 50 MG
50 CAPSULE ORAL ONCE
Refills: 0 | Status: COMPLETED | OUTPATIENT
Start: 2019-10-20 | End: 2019-10-20

## 2019-10-20 RX ADMIN — Medication 2 MILLIGRAM(S): at 20:02

## 2019-10-20 RX ADMIN — HALOPERIDOL DECANOATE 10 MILLIGRAM(S): 100 INJECTION INTRAMUSCULAR at 08:14

## 2019-10-20 RX ADMIN — HALOPERIDOL DECANOATE 5 MILLIGRAM(S): 100 INJECTION INTRAMUSCULAR at 20:02

## 2019-10-20 RX ADMIN — Medication 2 MILLIGRAM(S): at 08:14

## 2019-10-20 RX ADMIN — DIVALPROEX SODIUM 1000 MILLIGRAM(S): 500 TABLET, DELAYED RELEASE ORAL at 20:02

## 2019-10-20 RX ADMIN — DIVALPROEX SODIUM 1000 MILLIGRAM(S): 500 TABLET, DELAYED RELEASE ORAL at 08:14

## 2019-10-20 RX ADMIN — Medication 50 MILLIGRAM(S): at 21:01

## 2019-10-21 RX ADMIN — HALOPERIDOL DECANOATE 5 MILLIGRAM(S): 100 INJECTION INTRAMUSCULAR at 21:30

## 2019-10-21 RX ADMIN — Medication 2 MILLIGRAM(S): at 08:41

## 2019-10-21 RX ADMIN — Medication 2 MILLIGRAM(S): at 21:30

## 2019-10-21 RX ADMIN — DIVALPROEX SODIUM 1000 MILLIGRAM(S): 500 TABLET, DELAYED RELEASE ORAL at 21:35

## 2019-10-21 RX ADMIN — DIVALPROEX SODIUM 1000 MILLIGRAM(S): 500 TABLET, DELAYED RELEASE ORAL at 08:41

## 2019-10-21 RX ADMIN — HALOPERIDOL DECANOATE 10 MILLIGRAM(S): 100 INJECTION INTRAMUSCULAR at 08:41

## 2019-10-21 NOTE — CHART NOTE - NSCHARTNOTEFT_GEN_A_CORE
Social Work Note:    Treatment team met with patient to discuss treatment plan, medications and discharge plan.  During the day patient is observed to be visible on the unit.  He is encouraged to attend and actively participate in groups. Earlier today this worker educated patient to groups that were being offered today.  This worker met with patient to discuss discharge plan. He will resume treatment at [facility].  He is aware and agreeable to same.      Mental Status Exam:    Mood – Neutral   Sleep - Good  Appetite - Good  ADLs - Fair  Thought Process – Linear    Observation – [id number]    No barriers to discharge identified at this time.     At this time patient is not psychiatrically stable for discharge.

## 2019-10-22 PROCEDURE — 99231 SBSQ HOSP IP/OBS SF/LOW 25: CPT

## 2019-10-22 RX ADMIN — HALOPERIDOL DECANOATE 5 MILLIGRAM(S): 100 INJECTION INTRAMUSCULAR at 21:15

## 2019-10-22 RX ADMIN — Medication 2 MILLIGRAM(S): at 23:23

## 2019-10-22 RX ADMIN — HALOPERIDOL DECANOATE 10 MILLIGRAM(S): 100 INJECTION INTRAMUSCULAR at 08:42

## 2019-10-22 RX ADMIN — Medication 2 MILLIGRAM(S): at 21:14

## 2019-10-22 RX ADMIN — DIVALPROEX SODIUM 1000 MILLIGRAM(S): 500 TABLET, DELAYED RELEASE ORAL at 21:16

## 2019-10-22 RX ADMIN — Medication 2 MILLIGRAM(S): at 08:42

## 2019-10-22 RX ADMIN — DIVALPROEX SODIUM 1000 MILLIGRAM(S): 500 TABLET, DELAYED RELEASE ORAL at 08:42

## 2019-10-22 RX ADMIN — HALOPERIDOL DECANOATE 5 MILLIGRAM(S): 100 INJECTION INTRAMUSCULAR at 23:24
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
## 2019-10-23 PROCEDURE — 99231 SBSQ HOSP IP/OBS SF/LOW 25: CPT

## 2019-10-23 RX ORDER — DIVALPROEX SODIUM 500 MG/1
1000 TABLET, DELAYED RELEASE ORAL ONCE
Refills: 0 | Status: COMPLETED | OUTPATIENT
Start: 2019-10-23 | End: 2019-10-23

## 2019-10-23 RX ADMIN — DIVALPROEX SODIUM 1000 MILLIGRAM(S): 500 TABLET, DELAYED RELEASE ORAL at 21:03

## 2019-10-23 RX ADMIN — Medication 2 MILLIGRAM(S): at 08:50

## 2019-10-23 RX ADMIN — DIVALPROEX SODIUM 1000 MILLIGRAM(S): 500 TABLET, DELAYED RELEASE ORAL at 08:50

## 2019-10-23 RX ADMIN — HALOPERIDOL DECANOATE 10 MILLIGRAM(S): 100 INJECTION INTRAMUSCULAR at 08:19

## 2019-10-23 RX ADMIN — Medication 2 MILLIGRAM(S): at 21:11

## 2019-10-23 RX ADMIN — HALOPERIDOL DECANOATE 5 MILLIGRAM(S): 100 INJECTION INTRAMUSCULAR at 21:04

## 2019-10-23 RX ADMIN — Medication 2 MILLIGRAM(S): at 21:04
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
## 2019-10-23 NOTE — CHART NOTE - NSCHARTNOTEFT_GEN_A_CORE
Social Work Note:    Treatment team met with patient to discuss treatment plan, medications and discharge plan.  This morning patient was laying in his bed at time of interview.  During the day patient is observed to be visible on the unit.  He is encouraged to attend and actively participate in groups. This worker met with patient to discuss discharge plan. He will resume treatment at [facility] [place].  He is aware and agreeable to same.      Mental Status Exam:    Mood – Neutral   Sleep - Fair  Appetite - Good  ADLs - Fair  Thought Process – Linear    Observation – [id number]    No barriers to discharge identified at this time.     At this time patient is not psychiatrically stable for discharge.

## 2019-10-24 PROCEDURE — 99231 SBSQ HOSP IP/OBS SF/LOW 25: CPT

## 2019-10-24 RX ORDER — BENZOCAINE AND MENTHOL 5; 1 G/100ML; G/100ML
1 LIQUID ORAL DAILY
Refills: 0 | Status: DISCONTINUED | OUTPATIENT
Start: 2019-10-24 | End: 2019-10-28

## 2019-10-24 RX ORDER — DIVALPROEX SODIUM 500 MG/1
1000 TABLET, DELAYED RELEASE ORAL EVERY 12 HOURS
Refills: 0 | Status: DISCONTINUED | OUTPATIENT
Start: 2019-10-24 | End: 2019-10-28

## 2019-10-24 RX ADMIN — BENZOCAINE AND MENTHOL 1 LOZENGE: 5; 1 LIQUID ORAL at 22:40

## 2019-10-24 RX ADMIN — Medication 2 MILLIGRAM(S): at 20:41

## 2019-10-24 RX ADMIN — Medication 2 MILLIGRAM(S): at 11:31

## 2019-10-24 RX ADMIN — HALOPERIDOL DECANOATE 5 MILLIGRAM(S): 100 INJECTION INTRAMUSCULAR at 20:41

## 2019-10-24 RX ADMIN — DIVALPROEX SODIUM 1000 MILLIGRAM(S): 500 TABLET, DELAYED RELEASE ORAL at 20:41

## 2019-10-24 RX ADMIN — HALOPERIDOL DECANOATE 10 MILLIGRAM(S): 100 INJECTION INTRAMUSCULAR at 11:30
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
## 2019-10-25 PROCEDURE — 99231 SBSQ HOSP IP/OBS SF/LOW 25: CPT

## 2019-10-25 RX ADMIN — HALOPERIDOL DECANOATE 5 MILLIGRAM(S): 100 INJECTION INTRAMUSCULAR at 20:54

## 2019-10-25 RX ADMIN — Medication 2 MILLIGRAM(S): at 20:54

## 2019-10-25 RX ADMIN — HALOPERIDOL DECANOATE 10 MILLIGRAM(S): 100 INJECTION INTRAMUSCULAR at 08:34

## 2019-10-25 RX ADMIN — DIVALPROEX SODIUM 1000 MILLIGRAM(S): 500 TABLET, DELAYED RELEASE ORAL at 20:53

## 2019-10-25 RX ADMIN — DIVALPROEX SODIUM 1000 MILLIGRAM(S): 500 TABLET, DELAYED RELEASE ORAL at 08:33

## 2019-10-25 RX ADMIN — Medication 2 MILLIGRAM(S): at 08:33

## 2019-10-25 NOTE — PROGRESS NOTE BEHAVIORAL HEALTH - PERCEPTIONS
No abnormalities
Auditory hallucinations
No abnormalities

## 2019-10-25 NOTE — PROGRESS NOTE BEHAVIORAL HEALTH - NSBHATTESTSEENBY_PSY_A_CORE
attending Psychiatrist without NP/Trainee
Attending Psychiatrist supervising NP/Trainee, meeting pt...
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
## 2019-10-25 NOTE — PROGRESS NOTE BEHAVIORAL HEALTH - NSBHPTASSESSDT_PSY_A_CORE
17-Oct-2019 11:00
18-Oct-2019 08:55
19-Oct-2019 09:48
22-Oct-2019 11:02
23-Oct-2019 16:15
24-Oct-2019 15:23
19-Oct-2019 09:48
25-Oct-2019 12:47

## 2019-10-25 NOTE — PROGRESS NOTE BEHAVIORAL HEALTH - AXIS III
Pt c/o sore throat x 2 days. Denies fever, cough.
None known

## 2019-10-25 NOTE — PROGRESS NOTE BEHAVIORAL HEALTH - RISK ASSESSMENT
Patient has a long history of schizophrenia with command type hallucinations which tell him to do violent acts, is currently on disability, has a high school education, does not have any siblings, has a good relationship with his grandparents. Patient has seem calm throughout his stay on the unit so far. Considering what is said above, the patient is currently a MODERATE level of risk for dangerous behavior.

## 2019-10-25 NOTE — PROGRESS NOTE BEHAVIORAL HEALTH - PRIMARY DX
Disorganized schizophrenia
Schizophrenia, unspecified type
Disorganized schizophrenia
Schizoaffective disorder, unspecified type
Schizoaffective disorder, unspecified type
Disorganized schizophrenia
Schizoaffective disorder, unspecified type
Schizoaffective disorder, unspecified type

## 2019-10-25 NOTE — PROGRESS NOTE BEHAVIORAL HEALTH - NSBHCHARTREVIEWVS_PSY_A_CORE FT
Vital Signs Last 24 Hrs  T(C): 36.1 (25 Oct 2019 10:00), Max: 36.1 (25 Oct 2019 10:00)  T(F): 96.9 (25 Oct 2019 10:00), Max: 96.9 (25 Oct 2019 10:00)  HR: 62 (25 Oct 2019 10:00) (62 - 87)  BP: 110/64 (25 Oct 2019 10:00) (110/64 - 120/52)  BP(mean): --  RR: 16 (25 Oct 2019 10:00) (16 - 18)  SpO2: --
Vital Signs Last 24 Hrs  T(C): 35.6 (22 Oct 2019 05:56), Max: 35.6 (21 Oct 2019 18:13)  T(F): 96.1 (22 Oct 2019 05:56), Max: 96.1 (22 Oct 2019 05:56)  HR: 69 (22 Oct 2019 05:56) (68 - 69)  BP: 109/55 (22 Oct 2019 05:56) (109/55 - 116/60)  BP(mean): --  RR: 16 (22 Oct 2019 05:56) (16 - 16)  SpO2: --
Vital Signs Last 24 Hrs  T(C): 35.6 (22 Oct 2019 05:56), Max: 35.6 (21 Oct 2019 18:13)  T(F): 96.1 (22 Oct 2019 05:56), Max: 96.1 (22 Oct 2019 05:56)  HR: 69 (22 Oct 2019 05:56) (68 - 69)  BP: 109/55 (22 Oct 2019 05:56) (109/55 - 116/60)  BP(mean): --  RR: 16 (22 Oct 2019 05:56) (16 - 16)  SpO2: --
Vital Signs Last 24 Hrs  T(C): 36.1 (18 Oct 2019 08:24), Max: 36.4 (17 Oct 2019 15:41)  T(F): 97 (18 Oct 2019 08:24), Max: 97.5 (17 Oct 2019 15:41)  HR: 75 (18 Oct 2019 08:24) (74 - 75)  BP: 110/71 (18 Oct 2019 08:24) (110/71 - 121/67)  BP(mean): --  RR: 16 (18 Oct 2019 08:24) (16 - 18)  SpO2: --
Vital Signs Last 24 Hrs  T(C): 36.2 (20 Oct 2019 09:00), Max: 36.6 (20 Oct 2019 05:58)  T(F): 97.2 (20 Oct 2019 09:00), Max: 97.8 (20 Oct 2019 05:58)  HR: 69 (20 Oct 2019 09:00) (60 - 69)  BP: 88/48 (20 Oct 2019 09:00) (88/48 - 90/50)  BP(mean): --  RR: 16 (20 Oct 2019 09:00) (16 - 20)  SpO2: --
Vital Signs Last 24 Hrs  T(C): 35.8 (17 Oct 2019 09:30), Max: 36.4 (16 Oct 2019 15:12)  T(F): 96.4 (17 Oct 2019 09:30), Max: 97.5 (16 Oct 2019 15:12)  HR: 84 (17 Oct 2019 09:30) (60 - 84)  BP: 99/79 (17 Oct 2019 09:30) (99/79 - 118/67)  BP(mean): --  RR: 16 (17 Oct 2019 09:30) (16 - 20)  SpO2: 99% (16 Oct 2019 15:12) (99% - 99%)

## 2019-10-25 NOTE — CHART NOTE - NSCHARTNOTEFT_GEN_A_CORE
Social Work Note:    Treatment team met with patient to discuss treatment plan, medications and discharge plan.  This morning patient was laying in his bed at time of interview.  During the day patient is observed to be visible on the unit.  He is encouraged to attend and actively participate in groups. This worker met with patient to discuss discharge plan. He is not welcome to return to his grandparents home at this time.  Patient advises he may be able to stay with a friend in [place] but did not provide information such as address or contact number.  Back up plan is for shelter application.  Patient signed consent for same.  Application sent to [facility] for review.     Mental Status Exam:    Mood – Neutral   Sleep - Fair  Appetite - Good  ADLs - Fair  Thought Process – Linear    Observation – [id number]    No barriers to discharge identified at this time.     At this time patient is not psychiatrically stable for discharge.

## 2019-10-25 NOTE — PROGRESS NOTE BEHAVIORAL HEALTH - NSBHADMITIPOBSFT_PSY_A_CORE
safety
Patient is calm and not disrupting the unit
routine
routine
Patient is calm and not disrupting the unit

## 2019-10-25 NOTE — PROGRESS NOTE BEHAVIORAL HEALTH - NSBHFUPINTERVALCCFT_PSY_A_CORE
"I am well "
"I'm pretty good"
"Okay"
"im okay"
I feel ok' pt stated
"I feel ok' "
"alright"
"I'm good"

## 2019-10-26 LAB
ESTIMATED AVERAGE GLUCOSE: 103 MG/DL — SIGNIFICANT CHANGE UP (ref 68–114)
HBA1C BLD-MCNC: 5.2 % — SIGNIFICANT CHANGE UP (ref 4–5.6)
TSH SERPL-MCNC: 4.01 UIU/ML — SIGNIFICANT CHANGE UP (ref 0.27–4.2)

## 2019-10-26 RX ORDER — HYDROXYZINE HCL 10 MG
25 TABLET ORAL ONCE
Refills: 0 | Status: COMPLETED | OUTPATIENT
Start: 2019-10-26 | End: 2019-10-26

## 2019-10-26 RX ADMIN — HALOPERIDOL DECANOATE 5 MILLIGRAM(S): 100 INJECTION INTRAMUSCULAR at 20:46

## 2019-10-26 RX ADMIN — DIVALPROEX SODIUM 1000 MILLIGRAM(S): 500 TABLET, DELAYED RELEASE ORAL at 09:17

## 2019-10-26 RX ADMIN — Medication 2 MILLIGRAM(S): at 09:18

## 2019-10-26 RX ADMIN — Medication 2 MILLIGRAM(S): at 20:41

## 2019-10-26 RX ADMIN — HALOPERIDOL DECANOATE 10 MILLIGRAM(S): 100 INJECTION INTRAMUSCULAR at 09:18

## 2019-10-26 RX ADMIN — DIVALPROEX SODIUM 1000 MILLIGRAM(S): 500 TABLET, DELAYED RELEASE ORAL at 20:40

## 2019-10-26 RX ADMIN — Medication 25 MILLIGRAM(S): at 20:41

## 2019-10-27 RX ORDER — HYDROXYZINE HCL 10 MG
50 TABLET ORAL ONCE
Refills: 0 | Status: COMPLETED | OUTPATIENT
Start: 2019-10-27 | End: 2019-10-27

## 2019-10-27 RX ADMIN — DIVALPROEX SODIUM 1000 MILLIGRAM(S): 500 TABLET, DELAYED RELEASE ORAL at 08:20

## 2019-10-27 RX ADMIN — Medication 50 MILLIGRAM(S): at 20:10

## 2019-10-27 RX ADMIN — Medication 2 MILLIGRAM(S): at 08:21

## 2019-10-27 RX ADMIN — HALOPERIDOL DECANOATE 5 MILLIGRAM(S): 100 INJECTION INTRAMUSCULAR at 20:09

## 2019-10-27 RX ADMIN — HALOPERIDOL DECANOATE 10 MILLIGRAM(S): 100 INJECTION INTRAMUSCULAR at 08:21

## 2019-10-27 RX ADMIN — Medication 2 MILLIGRAM(S): at 20:10

## 2019-10-27 RX ADMIN — DIVALPROEX SODIUM 1000 MILLIGRAM(S): 500 TABLET, DELAYED RELEASE ORAL at 20:09

## 2019-10-28 VITALS
TEMPERATURE: 98 F | HEART RATE: 80 BPM | RESPIRATION RATE: 18 BRPM | SYSTOLIC BLOOD PRESSURE: 110 MMHG | DIASTOLIC BLOOD PRESSURE: 59 MMHG

## 2019-10-28 RX ORDER — HALOPERIDOL DECANOATE 100 MG/ML
1 INJECTION INTRAMUSCULAR
Qty: 14 | Refills: 0
Start: 2019-10-28 | End: 2019-11-10

## 2019-10-28 RX ORDER — HALOPERIDOL DECANOATE 100 MG/ML
150 INJECTION INTRAMUSCULAR ONCE
Refills: 0 | Status: COMPLETED | OUTPATIENT
Start: 2019-10-28 | End: 2019-10-28

## 2019-10-28 RX ORDER — BENZTROPINE MESYLATE 1 MG
1 TABLET ORAL
Qty: 28 | Refills: 0
Start: 2019-10-28 | End: 2019-11-10

## 2019-10-28 RX ORDER — HALOPERIDOL DECANOATE 100 MG/ML
0 INJECTION INTRAMUSCULAR
Qty: 0 | Refills: 0 | DISCHARGE

## 2019-10-28 RX ORDER — DIVALPROEX SODIUM 500 MG/1
2 TABLET, DELAYED RELEASE ORAL
Qty: 56 | Refills: 0
Start: 2019-10-28 | End: 2019-11-10

## 2019-10-28 RX ADMIN — HALOPERIDOL DECANOATE 150 MILLIGRAM(S): 100 INJECTION INTRAMUSCULAR at 14:25

## 2019-10-28 RX ADMIN — Medication 2 MILLIGRAM(S): at 09:14

## 2019-10-28 RX ADMIN — HALOPERIDOL DECANOATE 10 MILLIGRAM(S): 100 INJECTION INTRAMUSCULAR at 08:20

## 2019-10-28 RX ADMIN — DIVALPROEX SODIUM 1000 MILLIGRAM(S): 500 TABLET, DELAYED RELEASE ORAL at 08:19

## 2019-10-28 NOTE — DISCHARGE NOTE BEHAVIORAL HEALTH - NSBHDCSWCOMMENTSFT_PSY_A_CORE
Discharge Summary Faxed to [facility] [phone] on 10/28/2019 at 4pm.

## 2019-10-28 NOTE — PROGRESS NOTE ADULT - ASSESSMENT
medically stable with no acute issues  labs all stasble
medically stable with no acute issues

## 2019-10-28 NOTE — PROGRESS NOTE ADULT - REASON FOR ADMISSION
schizophrenia / worsening psychosis

## 2019-10-28 NOTE — CHART NOTE - NSCHARTNOTEFT_GEN_A_CORE
Social Work Note:    Treatment team met with patient to discuss treatment plan, medications and discharge plan.  This morning patient was visible on the unit at time of interview.  This worker met with patient to discuss discharge plan. He is not welcome to return to his grandparents home at this time.  Patient advises he may be able to stay with a friend in [place] but did not provide information such as address or contact number.  This morning he provided contact information for his uncle Darrel - but this worker did not receive a return call to confirm that patient can reside with him in [place]. Back up plan is for shelter application.  Patient signed consent for same.  Application sent to [facility] for review.     Mental Status Exam:    Mood – Neutral   Sleep - Fair  Appetite - Good  ADLs - Fair  Thought Process – Linear    Observation – [id number]        At this time patient is not psychiatrically stable for discharge.

## 2019-10-28 NOTE — PROGRESS NOTE ADULT - SUBJECTIVE AND OBJECTIVE BOX
pt stable alert in NAD  no new complaints    SCHIZOPHRENIA UNSPECIFIED TYPE HALLUCINATIONS  ^EDP/POSSIBLY HIGH ON PCP  Handoff  MEWS Score  Schizophrenia  Schizophrenia, unspecified type  Psychiatric symptoms  Schizoaffective disorder, unspecified type  Schizophrenia, unspecified type  Disorganized schizophrenia  EDP/POSSIBLY HIGH ON PCP  90+  Hallucinations    HEALTH ISSUES - PROBLEM Dx:  Schizoaffective disorder, unspecified type  Schizophrenia, unspecified type: Schizophrenia, unspecified type  Disorganized schizophrenia        PAST MEDICAL & SURGICAL HISTORY:  Schizophrenia    No Known Allergies      FAMILY HISTORY:      benztropine 2 milliGRAM(s) Oral every 12 hours  haloperidol     Tablet 5 milliGRAM(s) Oral at bedtime  haloperidol     Tablet 10 milliGRAM(s) Oral daily  haloperidol     Tablet 5 milliGRAM(s) Oral every 6 hours PRN  nicotine  Polacrilex Gum 2 milliGRAM(s) Oral every 4 hours PRN      T(C): 35.8 (10-23-19 @ 17:42), Max: 36.8 (10-23-19 @ 09:00)  HR: 56 (10-23-19 @ 17:42) (54 - 56)  BP: 102/55 (10-23-19 @ 17:42) (102/55 - 108/57)  RR: 16 (10-23-19 @ 17:42) (16 - 16)  SpO2: --    PE;  general:  no changes from previosu innad    Lungs:    Heart:    EXT:    Neuro:  aelrt no deficits      14.9  44.6  4.68  16  0.7  4.6  84      10-24    140  |  103  |  16  ----------------------------<  84  4.6   |  28  |  0.7    Ca    9.6      24 Oct 2019 07:26    TPro  6.4  /  Alb  3.9  /  TBili  0.7  /  DBili  x   /  AST  12  /  ALT  12  /  AlkPhos  62  10-24      LIVER FUNCTIONS - ( 24 Oct 2019 07:26 )  Alb: 3.9 g/dL / Pro: 6.4 g/dL / ALK PHOS: 62 U/L / ALT: 12 U/L / AST: 12 U/L / GGT: x                                   14.9   4.68  )-----------( 234      ( 24 Oct 2019 07:26 )             44.6       CAPILLARY BLOOD GLUCOSE
pt stable alert in NAD  no new complaints    SCHIZOPHRENIA UNSPECIFIED TYPE HALLUCINATIONS  ^EDP/POSSIBLY HIGH ON PCP  Handoff  MEWS Score  Schizophrenia  Schizophrenia, unspecified type  Psychiatric symptoms  Schizoaffective disorder, unspecified type  Schizophrenia, unspecified type  Disorganized schizophrenia  EDP/POSSIBLY HIGH ON PCP  90+  Hallucinations      No Known Allergies          benzocaine 15 mG/menthol 3.6 mG Lozenge 1 Lozenge Oral daily PRN  benztropine 2 milliGRAM(s) Oral every 12 hours  diVALproex DR 1000 milliGRAM(s) Oral every 12 hours  haloperidol     Tablet 5 milliGRAM(s) Oral at bedtime  haloperidol     Tablet 10 milliGRAM(s) Oral daily  haloperidol     Tablet 5 milliGRAM(s) Oral every 6 hours PRN  nicotine  Polacrilex Gum 2 milliGRAM(s) Oral every 4 hours PRN      T(C): 36.3 (10-28-19 @ 06:17), Max: 36.3 (10-27-19 @ 08:24)  HR: 78 (10-28-19 @ 06:17) (57 - 83)  BP: 121/58 (10-28-19 @ 06:17) (95/53 - 121/58)  RR: 20 (10-28-19 @ 06:17) (16 - 20)  SpO2: --    PE;  general:  no cahnges noted from previosu innad    Lungs:    Heart:    EXT:    Neuro:  aelrt no deficits                          CAPILLARY BLOOD GLUCOSE
pt stable alert in NAD  no new complaints    SCHIZOPHRENIA UNSPECIFIED TYPE HALLUCINATIONS  ^EDP/POSSIBLY HIGH ON PCP  Handoff  MEWS Score  Schizophrenia  Schizophrenia, unspecified type  Psychiatric symptoms  Schizoaffective disorder, unspecified type  Schizophrenia, unspecified type  Disorganized schizophrenia  EDP/POSSIBLY HIGH ON PCP  90+  Hallucinations    HEALTH ISSUES - PROBLEM Dx:  Schizoaffective disorder, unspecified type  Schizophrenia, unspecified type: Schizophrenia, unspecified type  Disorganized schizophrenia        PAST MEDICAL & SURGICAL HISTORY:  Schizophrenia    No Known Allergies      FAMILY HISTORY:      benztropine 2 milliGRAM(s) Oral every 12 hours  diVALproex DR 1000 milliGRAM(s) Oral every 12 hours  haloperidol     Tablet 5 milliGRAM(s) Oral at bedtime  haloperidol     Tablet 10 milliGRAM(s) Oral daily  haloperidol     Tablet 5 milliGRAM(s) Oral every 6 hours PRN  LORazepam     Tablet 2 milliGRAM(s) Oral every 6 hours PRN  nicotine  Polacrilex Gum 2 milliGRAM(s) Oral every 4 hours PRN      T(C): 36.3 (10-20-19 @ 16:30), Max: 36.3 (10-20-19 @ 16:30)  HR: 65 (10-20-19 @ 16:30) (65 - 69)  BP: 98/52 (10-20-19 @ 16:30) (88/48 - 98/52)  RR: 16 (10-20-19 @ 16:30) (16 - 16)  SpO2: --    PE;  general:  no cahnges in status innad    Lungs:    Heart:    EXT:    Neuro:  alert no defcitrs                          CAPILLARY BLOOD GLUCOSE
pt stable alert in NAD  no new complaints    SCHIZOPHRENIA UNSPECIFIED TYPE HALLUCINATIONS  ^EDP/POSSIBLY HIGH ON PCP  Handoff  MEWS Score  Schizophrenia  Schizophrenia, unspecified type  Psychiatric symptoms  Schizoaffective disorder, unspecified type  Schizophrenia, unspecified type  Disorganized schizophrenia  EDP/POSSIBLY HIGH ON PCP  90+  Hallucinations    HEALTH ISSUES - PROBLEM Dx:  Schizoaffective disorder, unspecified type  Schizophrenia, unspecified type: Schizophrenia, unspecified type  Disorganized schizophrenia        PAST MEDICAL & SURGICAL HISTORY:  Schizophrenia    Allergy Status Unknown      FAMILY HISTORY:      benztropine 2 milliGRAM(s) Oral every 12 hours  diVALproex DR 1000 milliGRAM(s) Oral every 12 hours  haloperidol     Tablet 5 milliGRAM(s) Oral at bedtime  haloperidol     Tablet 10 milliGRAM(s) Oral daily  haloperidol     Tablet 5 milliGRAM(s) Oral every 6 hours PRN  LORazepam     Tablet 2 milliGRAM(s) Oral every 6 hours PRN  nicotine  Polacrilex Gum 2 milliGRAM(s) Oral every 4 hours PRN      T(C): 36.1 (10-18-19 @ 08:24), Max: 36.4 (10-17-19 @ 15:41)  HR: 75 (10-18-19 @ 08:24) (74 - 84)  BP: 110/71 (10-18-19 @ 08:24) (99/79 - 121/67)  RR: 16 (10-18-19 @ 08:24) (16 - 18)  SpO2: --    PE;  general:  stable no cahnges noted in nad    Lungs:    Heart:    EXT:    Neuro: alert nod eficiits      14.5  44.7  5.79  16  0.7  4.3  97                      CAPILLARY BLOOD GLUCOSE

## 2019-10-28 NOTE — CHART NOTE - NSCHARTNOTEFT_GEN_A_CORE
Social Work Discharge Note:    Patient is for discharge today.   He is alert and oriented x3.  Mood is improved.  Anxiety has decreased.  Insight and judgment have improved.  Suicidal / homicidal ideation denied.      Patient will be discharged to his uncle [place] home in [place].  Anastasia’s uncle reached via telephone [phone] and no safety concerns were verbalized.   will be discharged to [address] [place] [address].  Plan is for referral to [facility] to address mental health / substance misuse.  He is aware and agreeable to same.      Patient is aware and agreeable to discharge today.

## 2019-10-28 NOTE — DISCHARGE NOTE BEHAVIORAL HEALTH - MEDICATION SUMMARY - MEDICATIONS TO TAKE
I will START or STAY ON the medications listed below when I get home from the hospital:    divalproex sodium 500 mg oral delayed release tablet  -- 2 tab(s) by mouth every 12 hours  -- Indication: For Disorganized schizophrenia    benztropine 2 mg oral tablet  -- 1 tab(s) by mouth every 12 hours  -- Indication: For Disorganized schizophrenia    haloperidol 10 mg oral tablet  -- 1 tab(s) by mouth once a day in the morning  -- Indication: For Disorganized schizophrenia    haloperidol 5 mg oral tablet  -- 1 tab(s) by mouth once a day (at bedtime), As Needed -agitation   -- Indication: For Disorganized schizophrenia

## 2019-10-28 NOTE — DISCHARGE NOTE BEHAVIORAL HEALTH - CONDITIONS AT DISCHARGE
Patient verbalizes understanding of discharge summary and follow-up care. He denies hallucinations, delusion, and/or suicidal ideation. Offered no complaints, no signs of distress noted.

## 2019-10-28 NOTE — PROGRESS NOTE ADULT - PROBLEM SELECTOR PLAN 1
continue present treatment as per psych plan as reviewed  Medically stable with no new changes in treatment  will continue to monitor medical status while being treated on psych
continue present treatment as per psych plan as reviewed  Medically stable with no new changes in treatment  will continue to monitor medical status while being treated on psych  medically stable for d/c as per psych

## 2019-10-28 NOTE — DISCHARGE NOTE BEHAVIORAL HEALTH - NSBHDCDXVALIDYESFT_PSY_A_CORE
Patient was seen to have increasingly disorganized behavior, unable to take care of himself, internally pre-occupied, responding to internal stimuli.

## 2019-10-28 NOTE — DISCHARGE NOTE BEHAVIORAL HEALTH - NSBHDCADMRISKMITFT_PSY_A_CORE
Stressed the importance of outpatient followup and sobriety. Given long acting injectable form of medication.
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
## 2019-10-28 NOTE — DISCHARGE NOTE BEHAVIORAL HEALTH - NSBHDCADMRISKREASONS_PSY_A_CORE
High utilizer of Inpateint/ED services/Poor engagement in outpt treatment/Non-adherence with medications/Poor residential stability

## 2019-11-06 DIAGNOSIS — F20.9 SCHIZOPHRENIA, UNSPECIFIED: ICD-10-CM

## 2019-11-06 DIAGNOSIS — Z91.19 PATIENT'S NONCOMPLIANCE WITH OTHER MEDICAL TREATMENT AND REGIMEN: ICD-10-CM

## 2019-11-06 DIAGNOSIS — Z86.59 PERSONAL HISTORY OF OTHER MENTAL AND BEHAVIORAL DISORDERS: ICD-10-CM

## 2019-11-06 DIAGNOSIS — F25.9 SCHIZOAFFECTIVE DISORDER, UNSPECIFIED: ICD-10-CM

## 2019-11-06 DIAGNOSIS — L30.9 DERMATITIS, UNSPECIFIED: ICD-10-CM

## 2019-11-06 DIAGNOSIS — F17.210 NICOTINE DEPENDENCE, CIGARETTES, UNCOMPLICATED: ICD-10-CM

## 2019-11-06 DIAGNOSIS — F23 BRIEF PSYCHOTIC DISORDER: ICD-10-CM

## 2019-11-06 DIAGNOSIS — F12.10 CANNABIS ABUSE, UNCOMPLICATED: ICD-10-CM

## 2019-11-06 DIAGNOSIS — F16.19: ICD-10-CM

## 2019-11-06 DIAGNOSIS — R45.1 RESTLESSNESS AND AGITATION: ICD-10-CM
